# Patient Record
Sex: FEMALE
[De-identification: names, ages, dates, MRNs, and addresses within clinical notes are randomized per-mention and may not be internally consistent; named-entity substitution may affect disease eponyms.]

---
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
## 2022-11-04 ENCOUNTER — APPOINTMENT (OUTPATIENT)
Dept: GENERAL RADIOLOGY | Facility: HOSPITAL | Age: 22
End: 2022-11-04

## 2022-11-04 ENCOUNTER — HOSPITAL ENCOUNTER (EMERGENCY)
Facility: HOSPITAL | Age: 22
Discharge: HOME | End: 2022-11-04
Attending: EMERGENCY MEDICINE | Admitting: EMERGENCY MEDICINE

## 2022-11-04 VITALS
BODY MASS INDEX: 43.72 KG/M2 | OXYGEN SATURATION: 100 % | RESPIRATION RATE: 18 BRPM | WEIGHT: 222.66 LBS | DIASTOLIC BLOOD PRESSURE: 71 MMHG | SYSTOLIC BLOOD PRESSURE: 117 MMHG | TEMPERATURE: 98 F | HEART RATE: 80 BPM | HEIGHT: 60 IN

## 2022-11-04 DIAGNOSIS — R06.00 DYSPNEA, UNSPECIFIED TYPE: Primary | ICD-10-CM

## 2022-11-04 DIAGNOSIS — F41.9 ANXIETY: ICD-10-CM

## 2022-11-04 DIAGNOSIS — R00.2 PALPITATIONS WITH REGULAR CARDIAC RHYTHM: ICD-10-CM

## 2022-11-04 LAB
ALBUMIN SERPL-MCNC: 4.2 G/DL (ref 3.5–5.2)
ALBUMIN/GLOB SERPL: 1 G/DL
ALP SERPL-CCNC: 152 U/L (ref 39–117)
ALT SERPL W P-5'-P-CCNC: 13 U/L (ref 1–33)
ANION GAP SERPL CALCULATED.3IONS-SCNC: 12.4 MMOL/L (ref 5–15)
AST SERPL-CCNC: 19 U/L (ref 1–32)
BASOPHILS # BLD AUTO: 0.06 10*3/MM3 (ref 0–0.2)
BASOPHILS NFR BLD AUTO: 0.7 % (ref 0–1.5)
BILIRUB SERPL-MCNC: 0.2 MG/DL (ref 0–1.2)
BUN SERPL-MCNC: 9 MG/DL (ref 6–20)
BUN/CREAT SERPL: 16.7 (ref 7–25)
CALCIUM SPEC-SCNC: 9.5 MG/DL (ref 8.6–10.5)
CHLORIDE SERPL-SCNC: 103 MMOL/L (ref 98–107)
CO2 SERPL-SCNC: 22.6 MMOL/L (ref 22–29)
CREAT SERPL-MCNC: 0.54 MG/DL (ref 0.57–1)
DEPRECATED RDW RBC AUTO: 42.3 FL (ref 37–54)
EGFRCR SERPLBLD CKD-EPI 2021: 135.4 ML/MIN/1.73
EOSINOPHIL # BLD AUTO: 0.13 10*3/MM3 (ref 0–0.4)
EOSINOPHIL NFR BLD AUTO: 1.4 % (ref 0.3–6.2)
ERYTHROCYTE [DISTWIDTH] IN BLOOD BY AUTOMATED COUNT: 15.1 % (ref 12.3–15.4)
GLOBULIN UR ELPH-MCNC: 4.1 GM/DL
GLUCOSE SERPL-MCNC: 89 MG/DL (ref 65–99)
HCG INTACT+B SERPL-ACNC: <0.5 MIU/ML
HCT VFR BLD AUTO: 40.6 % (ref 34–46.6)
HGB BLD-MCNC: 12 G/DL (ref 12–15.9)
HOLD SPECIMEN: NORMAL
HOLD SPECIMEN: NORMAL
IMM GRANULOCYTES # BLD AUTO: 0.02 10*3/MM3 (ref 0–0.05)
IMM GRANULOCYTES NFR BLD AUTO: 0.2 % (ref 0–0.5)
LYMPHOCYTES # BLD AUTO: 2.25 10*3/MM3 (ref 0.7–3.1)
LYMPHOCYTES NFR BLD AUTO: 24.7 % (ref 19.6–45.3)
MAGNESIUM SERPL-MCNC: 2 MG/DL (ref 1.7–2.2)
MCH RBC QN AUTO: 22.9 PG (ref 26.6–33)
MCHC RBC AUTO-ENTMCNC: 29.6 G/DL (ref 31.5–35.7)
MCV RBC AUTO: 77.6 FL (ref 79–97)
MONOCYTES # BLD AUTO: 0.41 10*3/MM3 (ref 0.1–0.9)
MONOCYTES NFR BLD AUTO: 4.5 % (ref 5–12)
NEUTROPHILS NFR BLD AUTO: 6.23 10*3/MM3 (ref 1.7–7)
NEUTROPHILS NFR BLD AUTO: 68.5 % (ref 42.7–76)
NRBC BLD AUTO-RTO: 0 /100 WBC (ref 0–0.2)
PLATELET # BLD AUTO: 328 10*3/MM3 (ref 140–450)
PMV BLD AUTO: 10.2 FL (ref 6–12)
POTASSIUM SERPL-SCNC: 3.5 MMOL/L (ref 3.5–5.2)
PROT SERPL-MCNC: 8.3 G/DL (ref 6–8.5)
RBC # BLD AUTO: 5.23 10*6/MM3 (ref 3.77–5.28)
SODIUM SERPL-SCNC: 138 MMOL/L (ref 136–145)
T4 FREE SERPL-MCNC: 1.18 NG/DL (ref 0.93–1.7)
TROPONIN T SERPL-MCNC: <0.01 NG/ML (ref 0–0.03)
TSH SERPL DL<=0.05 MIU/L-ACNC: 2.01 UIU/ML (ref 0.27–4.2)
WBC NRBC COR # BLD: 9.1 10*3/MM3 (ref 3.4–10.8)
WHOLE BLOOD HOLD COAG: NORMAL
WHOLE BLOOD HOLD SPECIMEN: NORMAL

## 2022-11-04 PROCEDURE — 93005 ELECTROCARDIOGRAM TRACING: CPT

## 2022-11-04 PROCEDURE — 80053 COMPREHEN METABOLIC PANEL: CPT

## 2022-11-04 PROCEDURE — 84484 ASSAY OF TROPONIN QUANT: CPT

## 2022-11-04 PROCEDURE — 84702 CHORIONIC GONADOTROPIN TEST: CPT | Performed by: EMERGENCY MEDICINE

## 2022-11-04 PROCEDURE — 85025 COMPLETE CBC W/AUTO DIFF WBC: CPT

## 2022-11-04 PROCEDURE — 83735 ASSAY OF MAGNESIUM: CPT

## 2022-11-04 PROCEDURE — 71045 X-RAY EXAM CHEST 1 VIEW: CPT

## 2022-11-04 PROCEDURE — 93010 ELECTROCARDIOGRAM REPORT: CPT | Performed by: INTERNAL MEDICINE

## 2022-11-04 PROCEDURE — 84439 ASSAY OF FREE THYROXINE: CPT | Performed by: EMERGENCY MEDICINE

## 2022-11-04 PROCEDURE — 36415 COLL VENOUS BLD VENIPUNCTURE: CPT

## 2022-11-04 PROCEDURE — 84443 ASSAY THYROID STIM HORMONE: CPT | Performed by: EMERGENCY MEDICINE

## 2022-11-04 PROCEDURE — 93005 ELECTROCARDIOGRAM TRACING: CPT | Performed by: EMERGENCY MEDICINE

## 2022-11-04 PROCEDURE — 99282 EMERGENCY DEPT VISIT SF MDM: CPT

## 2022-11-04 RX ORDER — SODIUM CHLORIDE 0.9 % (FLUSH) 0.9 %
10 SYRINGE (ML) INJECTION AS NEEDED
Status: DISCONTINUED | OUTPATIENT
Start: 2022-11-04 | End: 2022-11-05 | Stop reason: HOSPADM

## 2022-11-04 NOTE — ED PROVIDER NOTES
Time: 7:22 PM EDT  Chief Complaint:   Chief Complaint   Patient presents with   • Shortness of Breath   • Rapid Heart Rate       History of Present Illness:  Patient is a 22 y.o. year old female who presents to the emergency department with shortness of breath, fast HR.         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 No pertinent past medical history.  History reviewed. No pertinent surgical history.  History reviewed. No pertinent family history.    Home Medications:  Prior to Admission medications    Not on File        Social History:   Social History     Tobacco Use   • Smoking status: Never   • Smokeless tobacco: Never   Vaping Use   • Vaping Use: Every day   • Substances: Nicotine, Flavoring   Substance Use Topics   • Alcohol use: Yes     Comment: occasionally   • Drug use: Yes     Types: Marijuana     Comment: occasionally         Review of Systems:  Review of Systems   Constitutional: Negative for chills, diaphoresis, fatigue and fever.   HENT: Negative for ear pain, rhinorrhea and sore throat.    Eyes: Negative for visual disturbance.   Respiratory: Positive for shortness of breath. Negative for cough, hemoptysis, sputum production and wheezing.    Cardiovascular:  "Positive for palpitations. Negative for chest pain, claudication, syncope and PND.   Gastrointestinal: Negative for abdominal pain, diarrhea and vomiting.   Genitourinary: Negative for difficulty urinating, dysuria and flank pain.   Musculoskeletal: Negative for arthralgias, back pain, myalgias and neck pain.   Skin: Negative for rash.   Neurological: Negative for light-headedness and headaches.   Hematological: Negative for adenopathy.   Psychiatric/Behavioral: Negative.         Physical Exam:  /71   Pulse 80   Temp 98 °F (36.7 °C) (Oral)   Resp 18   Ht 152.4 cm (60\")   Wt 101 kg (222 lb 10.6 oz)   LMP 10/28/2022   SpO2 100%   BMI 43.49 kg/m²     Physical Exam  Vitals and nursing note reviewed.   Constitutional:       General: She is not in acute distress.     Appearance: Normal appearance. She is not toxic-appearing.   HENT:      Head: Normocephalic and atraumatic.      Nose: Nose normal.      Mouth/Throat:      Mouth: Mucous membranes are moist.   Eyes:      General: No scleral icterus.     Conjunctiva/sclera: Conjunctivae normal.   Cardiovascular:      Rate and Rhythm: Regular rhythm. Tachycardia present.      Pulses: Normal pulses.      Heart sounds: Normal heart sounds.   Pulmonary:      Effort: Pulmonary effort is normal. No respiratory distress.      Breath sounds: Normal breath sounds.   Chest:      Chest wall: No tenderness.   Abdominal:      General: Abdomen is flat. Bowel sounds are normal.      Palpations: Abdomen is soft. Hepatomegaly:         Tenderness: There is no abdominal tenderness (  ). Rebound:      Musculoskeletal:         General: Normal range of motion.      Cervical back: Normal range of motion and neck supple.      Right lower leg: No edema.      Left lower leg: No edema.   Skin:     General: Skin is warm and dry.   Neurological:      General: No focal deficit present.      Mental Status: She is alert and oriented to person, place, and time. Mental status is at baseline.      " Cranial Nerves: No cranial nerve deficit.      Motor: No weakness.   Psychiatric:         Mood and Affect: Mood normal.         Behavior: Behavior normal.         Thought Content: Thought content normal.         Judgment: Judgment normal.          Medications in the Emergency Department:  Medications   sodium chloride 0.9 % flush 10 mL (has no administration in time range)        Labs  Lab Results (last 24 hours)     Procedure Component Value Units Date/Time    CBC & Differential [[id number]]  (Abnormal) Collected: 11/04/22 1946    Specimen: Blood Updated: 11/04/22 1957    Narrative:      The following orders were created for panel order CBC & Differential.  Procedure                               Abnormality         Status                     ---------                               -----------         ------                     CBC Auto Differential[[id number]]        Abnormal            Final result                 Please view results for these tests on the individual orders.    Comprehensive Metabolic Panel [[id number]]  (Abnormal) Collected: 11/04/22 1946    Specimen: Blood Updated: 11/04/22 2019     Glucose 89 mg/dL      BUN 9 mg/dL      Creatinine 0.54 mg/dL      Sodium 138 mmol/L      Potassium 3.5 mmol/L      Chloride 103 mmol/L      CO2 22.6 mmol/L      Calcium 9.5 mg/dL      Total Protein 8.3 g/dL      Albumin 4.20 g/dL      ALT (SGPT) 13 U/L      AST (SGOT) 19 U/L      Alkaline Phosphatase 152 U/L      Total Bilirubin 0.2 mg/dL      Globulin 4.1 gm/dL      A/G Ratio 1.0 g/dL      BUN/Creatinine Ratio 16.7     Anion Gap 12.4 mmol/L      eGFR 135.4 mL/min/1.73      Comment: National Kidney Foundation and American Society of Nephrology (ASN) Task Force recommended calculation based on the Chronic Kidney Disease Epidemiology Collaboration (CKD-EPI) equation refit without adjustment for race.       Narrative:      GFR Normal >60  Chronic Kidney Disease <60  Kidney Failure <15      Magnesium [[id number]]  (Normal)  Collected: 11/04/22 1946    Specimen: Blood Updated: 11/04/22 2019     Magnesium 2.0 mg/dL     Troponin [[id number]]  (Normal) Collected: 11/04/22 1946    Specimen: Blood Updated: 11/04/22 2019     Troponin T <0.010 ng/mL     Narrative:      Troponin T Reference Range:  <= 0.03 ng/mL-   Negative for AMI  >0.03 ng/mL-     Abnormal for myocardial necrosis.  Clinicians would have to utilize clinical acumen, EKG, Troponin and serial changes to determine if it is an Acute Myocardial Infarction or myocardial injury due to an underlying chronic condition.       Results may be falsely decreased if patient taking Biotin.      CBC Auto Differential [[id number]]  (Abnormal) Collected: 11/04/22 1946    Specimen: Blood Updated: 11/04/22 1957     WBC 9.10 10*3/mm3      RBC 5.23 10*6/mm3      Hemoglobin 12.0 g/dL      Hematocrit 40.6 %      MCV 77.6 fL      MCH 22.9 pg      MCHC 29.6 g/dL      RDW 15.1 %      RDW-SD 42.3 fl      MPV 10.2 fL      Platelets 328 10*3/mm3      Neutrophil % 68.5 %      Lymphocyte % 24.7 %      Monocyte % 4.5 %      Eosinophil % 1.4 %      Basophil % 0.7 %      Immature Grans % 0.2 %      Neutrophils, Absolute 6.23 10*3/mm3      Lymphocytes, Absolute 2.25 10*3/mm3      Monocytes, Absolute 0.41 10*3/mm3      Eosinophils, Absolute 0.13 10*3/mm3      Basophils, Absolute 0.06 10*3/mm3      Immature Grans, Absolute 0.02 10*3/mm3      nRBC 0.0 /100 WBC     TSH [[id number]]  (Normal) Collected: 11/04/22 1946    Specimen: Blood Updated: 11/04/22 2050     TSH 2.010 uIU/mL     T4, Free [[id number]]  (Normal) Collected: 11/04/22 1946    Specimen: Blood Updated: 11/04/22 2050     Free T4 1.18 ng/dL     Narrative:      Results may be falsely increased if patient taking Biotin.      hCG, Quantitative, Pregnancy [[id number]] Collected: 11/04/22 1946    Specimen: Blood Updated: 11/04/22 2040     HCG Quantitative <0.50 mIU/mL     Narrative:      HCG Ranges by Gestational Age    Females - non-pregnant premenopausal   </=  1mIU/mL HCG  Females - postmenopausal               </= 7mIU/mL HCG    3 Weeks       5.4   -      72 mIU/mL  4 Weeks      10.2   -     708 mIU/mL  5 Weeks       217   -   8,245 mIU/mL  6 Weeks       152   -  32,177 mIU/mL  7 Weeks     4,059   - 153,767 mIU/mL  8 Weeks    31,366   - 149,094 mIU/mL  9 Weeks    59,109   - 135,901 mIU/mL  10 Weeks   44,186   - 170,409 mIU/mL  12 Weeks   27,107   - 201,615 mIU/mL  14 Weeks   24,302   -  93,646 mIU/mL  15 Weeks   12,540   -  69,747 mIU/mL  16 Weeks    8,904   -  55,332 mIU/mL  17 Weeks    8,240   -  51,793 mIU/mL  18 Weeks    9,649   -  55,271 mIU/mL    Results may be falsely decreased if patient taking Biotin.             Imaging:  XR Chest 1 View    Result Date: 11/4/2022  PROCEDURE: XR CHEST 1 VW  COMPARISON: None  INDICATIONS: SOA. RAPID HEART RATE  FINDINGS:  The heart is normal in size.  The lungs are well-expanded and free of infiltrates.  Bony structures appear intact.       No active disease is seen.       NOLAN GAMBOA MD       Electronically Signed and Approved By: NOLAN GAMBOA MD on 11/04/2022 at 21:16               Procedures:  Procedures    Progress  ED Course as of 11/04/22 2157 Fri Nov 04, 2022 2152 XR Chest 1 View  negative [DS]      ED Course User Index  [DS] Brenda Buckley APRN     Narrative & Impression    HEART RATE= 83  bpm  RR Interval= 724  ms  OH Interval= 158  ms  P Horizontal Axis= 12  deg  P Front Axis= 13  deg  QRSD Interval= 80  ms  QT Interval= 344  ms  QRS Axis= 60  deg  T Wave Axis= 7  deg  - NORMAL ECG -  Sinus rhythm                          The patient was initially evaluated in the triage area where orders were placed. The patient was later dispositioned by TERRY Parker.      The patient was advised to stay for completion of workup which includes but is not limited to communication of labs and radiological results, reassessment and plan. The patient was advised that leaving prior to disposition by a provider could result in  critical findings that are not communicated to the patient.     Medical Decision Making:  MDM  Number of Diagnoses or Management Options  Anxiety  Dyspnea, unspecified type  Palpitations with regular cardiac rhythm  Diagnosis management comments: I have spoken with the patient. I have explained the patient´s condition, diagnoses and treatment plan based on the information available to me at this time. I have answered the patient's questions and addressed any concerns. The patient has a good  understanding of the patient´s diagnosis, condition, and treatment plan as can be expected at this point. The vital signs have been stable. The patient´s condition is stable and appropriate for discharge from the emergency department.      The patient will pursue further outpatient evaluation with the primary care physician or other designated or consulting physician as outlined in the discharge instructions. They are agreeable to this plan of care and follow-up instructions have been explained in detail. The patient has received these instructions in written format and have expressed an understanding of the discharge instructions. The patient is aware that any significant change in condition or worsening of symptoms should prompt an immediate return to this or the closest emergency department or call to 911.         Amount and/or Complexity of Data Reviewed  Clinical lab tests: reviewed and ordered  Tests in the radiology section of CPT®: ordered and reviewed  Tests in the medicine section of CPT®: ordered and reviewed    Risk of Complications, Morbidity, and/or Mortality  Presenting problems: moderate  Diagnostic procedures: low  Management options: low    Patient Progress  Patient progress: stable       The following orders were placed after triage and evaluation:  Orders Placed This Encounter   Procedures   • XR Chest 1 View   • New [place] Draw   • Comprehensive Metabolic Panel   • Magnesium   • Troponin   • CBC Auto  Differential   • TSH   • T4, Free   • hCG, Quantitative, Pregnancy   • NPO Diet NPO Type: Strict NPO   • Undress & Gown   • Cardiac Monitoring   • Continuous Pulse Oximetry   • Oxygen Therapy- Nasal Cannula; 2 LPM; Titrate for SPO2: 92%   • ECG 12 Lead ED Triage Standing Order; Dysrhythmia   • Insert Peripheral IV   • CBC & Differential   • Green Top (Gel)   • Lavender Top   • Gold Top - SST   • Light Blue Top       Final diagnoses:   Dyspnea, unspecified type   Palpitations with regular cardiac rhythm   Anxiety          Disposition:  ED Disposition     ED Disposition   Discharge    Condition   Stable    Comment   --             This medical record created using voice recognition software.           Brenda Buckley, APRN  11/04/22 5515

## 2022-11-04 NOTE — ED TRIAGE NOTES
Pt to ED from work with reports of rapid HR and shortness of breath.  Pt states she has hx of anxiety but states this was more severe than previous episodes.      Upon arrival to ED, pt reports shortness of breath and denies rapid HR.

## 2022-11-05 LAB — QT INTERVAL: 344 MS

## 2022-11-05 NOTE — ED NOTES
Pt reports hx of panic attacks but states this felt more intense than what she normally experiences. States symptoms have since resolved.

## 2022-11-05 NOTE — DISCHARGE INSTRUCTIONS
All testing here today was normal and showed no acute abnormality to explain the symptoms you had.  Possibly this was an anxiety or panic attack.    Follow-up with PCP if reoccurrence    Return for new or worsening symptoms

## 2023-12-27 ENCOUNTER — HOSPITAL ENCOUNTER (EMERGENCY)
Facility: HOSPITAL | Age: 23
Discharge: LEFT BEFORE DISCHARGE | End: 2023-12-27
Attending: EMERGENCY MEDICINE | Admitting: EMERGENCY MEDICINE
Payer: MEDICAID

## 2023-12-27 ENCOUNTER — APPOINTMENT (OUTPATIENT)
Dept: GENERAL RADIOLOGY | Facility: HOSPITAL | Age: 23
End: 2023-12-27
Payer: MEDICAID

## 2023-12-27 VITALS
DIASTOLIC BLOOD PRESSURE: 81 MMHG | OXYGEN SATURATION: 100 % | HEIGHT: 60 IN | RESPIRATION RATE: 20 BRPM | HEART RATE: 111 BPM | WEIGHT: 221.34 LBS | TEMPERATURE: 98.7 F | BODY MASS INDEX: 43.46 KG/M2 | SYSTOLIC BLOOD PRESSURE: 145 MMHG

## 2023-12-27 DIAGNOSIS — S80.02XA CONTUSION OF LEFT KNEE, INITIAL ENCOUNTER: ICD-10-CM

## 2023-12-27 DIAGNOSIS — S30.1XXA CONTUSION OF ABDOMINAL WALL, INITIAL ENCOUNTER: ICD-10-CM
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
## 2023-12-27 DIAGNOSIS — S20.219A CONTUSION OF CHEST WALL, UNSPECIFIED LATERALITY, INITIAL ENCOUNTER: ICD-10-CM

## 2023-12-27 PROCEDURE — 99282 EMERGENCY DEPT VISIT SF MDM: CPT

## 2023-12-27 PROCEDURE — 73562 X-RAY EXAM OF KNEE 3: CPT

## 2023-12-27 RX ORDER — KETOROLAC TROMETHAMINE 30 MG/ML
30 INJECTION, SOLUTION INTRAMUSCULAR; INTRAVENOUS ONCE
Status: DISCONTINUED | OUTPATIENT
Start: 2023-12-27 | End: 2023-12-27 | Stop reason: HOSPADM

## 2023-12-27 RX ORDER — ORPHENADRINE CITRATE 30 MG/ML
60 INJECTION INTRAMUSCULAR; INTRAVENOUS ONCE
Status: DISCONTINUED | OUTPATIENT
Start: 2023-12-27 | End: 2023-12-27 | Stop reason: HOSPADM
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
## 2023-12-28 ENCOUNTER — APPOINTMENT (OUTPATIENT)
Dept: CT IMAGING | Facility: HOSPITAL | Age: 23
End: 2023-12-28
Payer: MEDICAID

## 2023-12-28 ENCOUNTER — HOSPITAL ENCOUNTER (EMERGENCY)
Facility: HOSPITAL | Age: 23
Discharge: HOME | End: 2023-12-28
Attending: EMERGENCY MEDICINE
Payer: MEDICAID

## 2023-12-28 VITALS
OXYGEN SATURATION: 100 % | BODY MASS INDEX: 43.11 KG/M2 | DIASTOLIC BLOOD PRESSURE: 69 MMHG | TEMPERATURE: 98.3 F | RESPIRATION RATE: 18 BRPM | HEIGHT: 60 IN | WEIGHT: 219.58 LBS | SYSTOLIC BLOOD PRESSURE: 128 MMHG | HEART RATE: 72 BPM
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
## 2023-12-28 DIAGNOSIS — S30.1XXA CONTUSION OF ABDOMINAL WALL, INITIAL ENCOUNTER: ICD-10-CM

## 2023-12-28 LAB — HCG INTACT+B SERPL-ACNC: <0.5 MIU/ML

## 2023-12-28 PROCEDURE — 84702 CHORIONIC GONADOTROPIN TEST: CPT | Performed by: EMERGENCY MEDICINE

## 2023-12-28 PROCEDURE — 36415 COLL VENOUS BLD VENIPUNCTURE: CPT

## 2023-12-28 PROCEDURE — 99285 EMERGENCY DEPT VISIT HI MDM: CPT

## 2023-12-28 PROCEDURE — 25510000001 IOPAMIDOL PER 1 ML: Performed by: EMERGENCY MEDICINE

## 2023-12-28 PROCEDURE — 74177 CT ABD & PELVIS W/CONTRAST: CPT

## 2023-12-28 PROCEDURE — 71260 CT THORAX DX C+: CPT

## 2023-12-28 RX ORDER — ACETAMINOPHEN 500 MG
1000 TABLET ORAL ONCE
Status: COMPLETED | OUTPATIENT
Start: 2023-12-28 | End: 2023-12-28

## 2023-12-28 RX ADMIN — IOPAMIDOL 100 ML: 755 INJECTION, SOLUTION INTRAVENOUS at 16:21

## 2023-12-28 RX ADMIN — ACETAMINOPHEN 1000 MG: 500 TABLET ORAL at 14:38

## 2023-12-28 NOTE — DISCHARGE INSTRUCTIONS
Your CTs are negative for any acute findings  Please ice over the bruising and take Tylenol for pain as needed
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
